# Patient Record
(demographics unavailable — no encounter records)

---

## 2025-07-29 NOTE — CONSULT LETTER
[Dear  ___] : Dear  [unfilled], [Consult Letter:] : I had the pleasure of evaluating your patient, [unfilled]. [Please see my note below.] : Please see my note below. [Consult Closing:] : Thank you very much for allowing me to participate in the care of this patient.  If you have any questions, please do not hesitate to contact me. [Sincerely,] : Sincerely, [FreeTextEntry2] : Marifer Esparza [FreeTextEntry3] : Xavier Chowdhury MD FACS Chief Colon and Rectal Surgery [facility]

## 2025-07-29 NOTE — PHYSICAL EXAM
[Normal Breath Sounds] : Normal breath sounds [Normal Heart Sounds] : normal heart sounds [Normal Rate and Rhythm] : normal rate and rhythm [No Rash or Lesion] : No rash or lesion [Alert] : alert [Oriented to Person] : oriented to person [Oriented to Place] : oriented to place [Oriented to Time] : oriented to time [Calm] : calm [Abdomen Masses] : No abdominal masses [Abdomen Tenderness] : ~T No ~M abdominal tenderness [JVD] : no jugular venous distention  [Wheezing] : no wheezing was heard [de-identified] : NAD [de-identified] : NC/AT [de-identified] : LES, steady gait

## 2025-07-29 NOTE — REVIEW OF SYSTEMS
[As Noted in HPI] : as noted in HPI [Negative] : Heme/Lymph [Confused] : no confusion [Convulsions] : no convulsions [de-identified] : headaches

## 2025-07-29 NOTE — HISTORY OF PRESENT ILLNESS
[FreeTextEntry1] : 19 y/o female presents today for initial consultation for intestinal malrotation.  She has history of intermittent abdominal/pelvic pain which she attributed to her hx of PCOS.  In May 2024, she had an episode of severe abdominal pain and low back pain which prompted a visit to [facility] ED.  CT A/P revealed presents of small bowel in right hemiabdomen and large bowel in the left hemiabdomen.  Intestinal malrotation without evidence of volvulus.    Since then she has had episodes of abdominal/pelvic pain, but these are more due to PCOS.  She is tolerating regular diet, but c/o nausea and reports bloating after meals and has been eating small more frequent meals.  She reports history of constipation and straining to defecate and notes bowel movements every 1-2 days.  She denies any vomiting, diarrhea or bleeding.

## 2025-07-29 NOTE — ASSESSMENT
[FreeTextEntry1] : Intestinal malrotation - I had a long discussion with the patient and her mother regarding intestinal malrotation - It appears she has had longstanding abdominal bloating and nausea issues that are likely consistent with malrotation - We discussed surgical repair including laparoscopic/robotic Allan's procedure with appendectomy.  Risks and benefits of the procedure were reviewed at length including possible open procedure - I recommended patient undergo CT scan with IV and p.o. contrast to better evaluate malrotation - Patient is interested in proceeding but would like to do so at the end of her school year which I believe is appropriate - All questions were answered - Patient to follow-up approximately 2 months before she wishes to proceed with surgery